# Patient Record
Sex: MALE | Race: WHITE | NOT HISPANIC OR LATINO
[De-identification: names, ages, dates, MRNs, and addresses within clinical notes are randomized per-mention and may not be internally consistent; named-entity substitution may affect disease eponyms.]

---

## 2021-04-18 PROBLEM — Z00.00 ENCOUNTER FOR PREVENTIVE HEALTH EXAMINATION: Status: ACTIVE | Noted: 2021-04-18

## 2021-04-19 ENCOUNTER — APPOINTMENT (OUTPATIENT)
Dept: HEART AND VASCULAR | Facility: CLINIC | Age: 66
End: 2021-04-19
Payer: COMMERCIAL

## 2021-04-19 PROCEDURE — 99214 OFFICE O/P EST MOD 30 MIN: CPT

## 2021-04-19 PROCEDURE — 99072 ADDL SUPL MATRL&STAF TM PHE: CPT

## 2021-04-22 RX ORDER — SENNOSIDES 8.6 MG TABLETS 8.6 MG/1
TABLET ORAL
Refills: 0 | Status: ACTIVE | COMMUNITY

## 2021-04-22 RX ORDER — CLONAZEPAM 0.25 MG/1
0.25 TABLET, ORALLY DISINTEGRATING ORAL
Refills: 0 | Status: ACTIVE | COMMUNITY

## 2021-04-22 RX ORDER — METHADONE HYDROCHLORIDE 10 MG/ML
CONCENTRATE ORAL
Refills: 0 | Status: ACTIVE | COMMUNITY

## 2021-04-22 RX ORDER — TOPIRAMATE 25 MG/1
25 CAPSULE, EXTENDED RELEASE ORAL
Refills: 0 | Status: ACTIVE | COMMUNITY

## 2021-04-22 NOTE — DATA REVIEWED
[FreeTextEntry1] : Jovan Stroud is an 66 y/o male referred by Dr. 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 Without any history of trauma he developed pain in that area with extreme sensitivity to touch several years ago. He had nerve blocks as well as surgical exploration performed by Dr. Wagoner years ago. He said that none of these procedures had an effect on his pain and hypersensitivity to touch in the area. He ended up seeing a pain management specialist and is currently on a regimen of methadone and several other medications without much relief. He apparently sent imaging electronically but so far we have been unable to access the scans.   He did show me some images on his cell phone of a CT and an ultrasound which do show what appears to be a fistula in the subcutaneous fat of the anterior abdominal wall. It was suggested on the CT that the blood supply was from the right inferior epigastric artery. On physical exam he has a scar in the area from previous surgical exploration. He is exquisitely tender to touch. I don't appreciate any pulsation on palpation. I did an ultrasound in the office and see a rounded structure that does not appear to show any flow (unlike the ultrasound he brought with him which was performed a few months back). Clearly this is not a typical problem and I told him I thought the best way to define this and possibly fix it is to do an angiogram with selective studies to try and find the fistula and close it with embolization if feasible. I also pointed out that AVMs and fistulas themselves are usually not painful so there may be something else going on, including a nerve compressed by the vascular lesion or possibly something totally unrelated. Whatever is causing the pain it seems to be neurogenic in origin. We will schedule him for the angiogram and possible embolization in the near future. He does have an allergy to generic Percocet with the GI intolerance.  He is also status post bilateral inguinal hernia repair performed by Wendy Wagoner and Artie.

## 2021-05-17 VITALS
HEART RATE: 60 BPM | SYSTOLIC BLOOD PRESSURE: 123 MMHG | OXYGEN SATURATION: 95 % | RESPIRATION RATE: 16 BRPM | DIASTOLIC BLOOD PRESSURE: 69 MMHG

## 2021-05-17 RX ORDER — CHLORHEXIDINE GLUCONATE 213 G/1000ML
1 SOLUTION TOPICAL ONCE
Refills: 0 | Status: DISCONTINUED | OUTPATIENT
Start: 2021-05-18 | End: 2021-05-18

## 2021-05-17 NOTE — H&P ADULT - NSHPLABSRESULTS_GEN_ALL_CORE
17.0   6.61  )-----------( 221      ( 18 May 2021 11:03 )             50.0   05-18    141  |  106  |  18  ----------------------------<  95  3.9   |  27  |  1.01    Ca    9.2      18 May 2021 11:04    TPro  7.3  /  Alb  4.3  /  TBili  0.4  /  DBili  x   /  AST  19  /  ALT  25  /  AlkPhos  62  05-18  PT/INR - ( 18 May 2021 11:03 )   PT: 11.4 sec;   INR: 0.95          PTT - ( 18 May 2021 11:03 )  PTT:33.4 sec

## 2021-05-17 NOTE — H&P ADULT - HISTORY OF PRESENT ILLNESS
COVID: ____________  Pharmacy: __________  Escort: ____________    History obtained from Dr. Palmer's note   64 y/o male referred by Dr. 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 Without any history of trauma, he developed pain in that area with extreme sensitivity to touch several years ago. He had nerve blocks as well as surgical exploration performed by Dr. Wagoner years ago. He said that none of these procedures had an effect on his pain and hypersensitivity to touch in the area. He ended up seeing a pain management specialist and is currently on a regimen of Methadone and several other medications without much relief. Patient did show Dr. Palmer images on his cell phone during office visit of a CT and ultrasound which did show what appears to be a fistula in the subcutaneous fat of the anterior abdominal wall. It was suggested on the CT that the blood supply was from the right inferior epigastric artery. On physical exam during office visit, he had a scar in the area from previous surgical exploration. He is exquisitely tender to touch. Dr. Palmer did not appreciate any pulsation on palpation. Dr. Palmer performed an ultrasound in the office and saw a rounded structure that does not appear to show any flow (unlike the ultrasound he brought with him which was performed a few months back). Dr. Palmer at that time felt this is not a typical problem and the best way to define and possibly fix this is to do an angiogram with selective studies to try and find the fistula and close it with embolization if feasible. Dr. Palmer also pointed out that AVM's and fistulas themselves are usually not painful so there may be something else going on, including a nerve compressed by the vascular lesion or possibly something totally unrelated. Dr. Palmer felt whatever is causing the pain seems to be neurogenic in origin. 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     In light of patient's symptoms, prior abnormal CT and ultrasound, and recent abnormal ultrasound at Dr. Palmer's office, patient now presents to [facility] for recommended angiogram/embolization.  COVID: Negative 05/16/2021, in [facility]   Pharmacy: __________  Escort: ____________    History obtained from Dr. Palmer's note   64 y/o male referred by Dr. 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 Without any history of trauma, he developed pain in that area with extreme sensitivity to touch several years ago. He had nerve blocks as well as surgical exploration performed by Dr. Wagoner years ago. He said that none of these procedures had an effect on his pain and hypersensitivity to touch in the area. He ended up seeing a pain management specialist and is currently on a regimen of Methadone and several other medications without much relief. Patient did show Dr. Palmer images on his cell phone during office visit of a CT and ultrasound which did show what appears to be a fistula in the subcutaneous fat of the anterior abdominal wall. It was suggested on the CT that the blood supply was from the right inferior epigastric artery. On physical exam during office visit, he had a scar in the area from previous surgical exploration. He is exquisitely tender to touch. Dr. Palmer did not appreciate any pulsation on palpation. Dr. Palmer performed an ultrasound in the office and saw a rounded structure that does not appear to show any flow (unlike the ultrasound he brought with him which was performed a few months back). Dr. Palmer at that time felt this is not a typical problem and the best way to define and possibly fix this is to do an angiogram with selective studies to try and find the fistula and close it with embolization if feasible. Dr. Palmer also pointed out that AVM's and fistulas themselves are usually not painful so there may be something else going on, including a nerve compressed by the vascular lesion or possibly something totally unrelated. Dr. Palmer felt whatever is causing the pain seems to be neurogenic in origin. 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     In light of patient's symptoms, prior abnormal CT and ultrasound, and recent abnormal ultrasound at Dr. Palmer's office, patient now presents to [facility] for recommended angiogram/embolization.  COVID: Negative 05/16/2021, in [facility]   Pharmacy: [facility], [place] [id number]      History obtained from Dr. Palmer's note   66 y/o male with PMH of malignant melanoma in L arm/leg s/p excision who was  referred by Dr. 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 Without any history of trauma, he developed pain in that area with extreme sensitivity to touch several years ago. He had nerve blocks as well as surgical exploration performed by Dr. Wagoner years ago. He said that none of these procedures had an effect on his pain and hypersensitivity to touch in the area. He ended up seeing a pain management specialist and is currently on a regimen of Methadone and several other medications without much relief. Patient did show Dr. Palmer images on his cell phone during office visit of a CT and ultrasound which did show what appears to be a fistula in the subcutaneous fat of the anterior abdominal wall. It was suggested on the CT that the blood supply was from the right inferior epigastric artery. On physical exam during office visit, he had a scar in the area from previous surgical exploration. He is exquisitely tender to touch. Dr. Palmer did not appreciate any pulsation on palpation. Dr. Palmer performed an ultrasound in the office and saw a rounded structure that does not appear to show any flow (unlike the ultrasound he brought with him which was performed a few months back). Dr. Palmer at that time felt this is not a typical problem and the best way to define and possibly fix this is to do an angiogram with selective studies to try and find the fistula and close it with embolization if feasible. Dr. Palmer also pointed out that AVM's and fistulas themselves are usually not painful so there may be something else going on, including a nerve compressed by the vascular lesion or possibly something totally unrelated. Dr. Palmer felt whatever is causing the pain seems to be neurogenic in origin. 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     In light of patient's symptoms, prior abnormal CT and ultrasound, and recent abnormal ultrasound at Dr. Palmer's office, patient now presents to [facility] for recommended angiogram/embolization.

## 2021-05-18 ENCOUNTER — INPATIENT (INPATIENT)
Facility: HOSPITAL | Age: 66
LOS: 0 days | Discharge: HOME | DRG: 272 | End: 2021-05-19
Attending: RADIOLOGY | Admitting: RADIOLOGY
Payer: COMMERCIAL

## 2021-05-18 DIAGNOSIS — Z98.890 OTHER SPECIFIED POSTPROCEDURAL STATES: Chronic | ICD-10-CM

## 2021-05-18 LAB
ALBUMIN SERPL ELPH-MCNC: 4.3 G/DL — SIGNIFICANT CHANGE UP (ref 3.3–5)
ALP SERPL-CCNC: 62 U/L — SIGNIFICANT CHANGE UP (ref 40–120)
ALT FLD-CCNC: 25 U/L — SIGNIFICANT CHANGE UP (ref 10–45)
ANION GAP SERPL CALC-SCNC: 8 MMOL/L — SIGNIFICANT CHANGE UP (ref 5–17)
APTT BLD: 33.4 SEC — SIGNIFICANT CHANGE UP (ref 27.5–35.5)
AST SERPL-CCNC: 19 U/L — SIGNIFICANT CHANGE UP (ref 10–40)
BASOPHILS # BLD AUTO: 0.06 K/UL — SIGNIFICANT CHANGE UP (ref 0–0.2)
BASOPHILS NFR BLD AUTO: 0.9 % — SIGNIFICANT CHANGE UP (ref 0–2)
BILIRUB SERPL-MCNC: 0.4 MG/DL — SIGNIFICANT CHANGE UP (ref 0.2–1.2)
BUN SERPL-MCNC: 18 MG/DL — SIGNIFICANT CHANGE UP (ref 7–23)
CALCIUM SERPL-MCNC: 9.2 MG/DL — SIGNIFICANT CHANGE UP (ref 8.4–10.5)
CHLORIDE SERPL-SCNC: 106 MMOL/L — SIGNIFICANT CHANGE UP (ref 96–108)
CHOLEST SERPL-MCNC: 225 MG/DL — HIGH
CO2 SERPL-SCNC: 27 MMOL/L — SIGNIFICANT CHANGE UP (ref 22–31)
CREAT SERPL-MCNC: 1.01 MG/DL — SIGNIFICANT CHANGE UP (ref 0.5–1.3)
EOSINOPHIL # BLD AUTO: 0.08 K/UL — SIGNIFICANT CHANGE UP (ref 0–0.5)
EOSINOPHIL NFR BLD AUTO: 1.2 % — SIGNIFICANT CHANGE UP (ref 0–6)
GLUCOSE SERPL-MCNC: 95 MG/DL — SIGNIFICANT CHANGE UP (ref 70–99)
HCT VFR BLD CALC: 50 % — SIGNIFICANT CHANGE UP (ref 39–50)
HDLC SERPL-MCNC: 47 MG/DL — SIGNIFICANT CHANGE UP
HGB BLD-MCNC: 17 G/DL — SIGNIFICANT CHANGE UP (ref 13–17)
IMM GRANULOCYTES NFR BLD AUTO: 0.3 % — SIGNIFICANT CHANGE UP (ref 0–1.5)
INR BLD: 0.95 — SIGNIFICANT CHANGE UP (ref 0.88–1.16)
LIPID PNL WITH DIRECT LDL SERPL: 150 MG/DL — HIGH
LYMPHOCYTES # BLD AUTO: 2.4 K/UL — SIGNIFICANT CHANGE UP (ref 1–3.3)
LYMPHOCYTES # BLD AUTO: 36.3 % — SIGNIFICANT CHANGE UP (ref 13–44)
MCHC RBC-ENTMCNC: 30.5 PG — SIGNIFICANT CHANGE UP (ref 27–34)
MCHC RBC-ENTMCNC: 34 GM/DL — SIGNIFICANT CHANGE UP (ref 32–36)
MCV RBC AUTO: 89.8 FL — SIGNIFICANT CHANGE UP (ref 80–100)
MONOCYTES # BLD AUTO: 0.65 K/UL — SIGNIFICANT CHANGE UP (ref 0–0.9)
MONOCYTES NFR BLD AUTO: 9.8 % — SIGNIFICANT CHANGE UP (ref 2–14)
NEUTROPHILS # BLD AUTO: 3.4 K/UL — SIGNIFICANT CHANGE UP (ref 1.8–7.4)
NEUTROPHILS NFR BLD AUTO: 51.5 % — SIGNIFICANT CHANGE UP (ref 43–77)
NON HDL CHOLESTEROL: 178 MG/DL — HIGH
NRBC # BLD: 0 /100 WBCS — SIGNIFICANT CHANGE UP (ref 0–0)
PLATELET # BLD AUTO: 221 K/UL — SIGNIFICANT CHANGE UP (ref 150–400)
POTASSIUM SERPL-MCNC: 3.9 MMOL/L — SIGNIFICANT CHANGE UP (ref 3.5–5.3)
POTASSIUM SERPL-SCNC: 3.9 MMOL/L — SIGNIFICANT CHANGE UP (ref 3.5–5.3)
PROT SERPL-MCNC: 7.3 G/DL — SIGNIFICANT CHANGE UP (ref 6–8.3)
PROTHROM AB SERPL-ACNC: 11.4 SEC — SIGNIFICANT CHANGE UP (ref 10.6–13.6)
RBC # BLD: 5.57 M/UL — SIGNIFICANT CHANGE UP (ref 4.2–5.8)
RBC # FLD: 12.1 % — SIGNIFICANT CHANGE UP (ref 10.3–14.5)
SODIUM SERPL-SCNC: 141 MMOL/L — SIGNIFICANT CHANGE UP (ref 135–145)
TRIGL SERPL-MCNC: 140 MG/DL — SIGNIFICANT CHANGE UP
WBC # BLD: 6.61 K/UL — SIGNIFICANT CHANGE UP (ref 3.8–10.5)
WBC # FLD AUTO: 6.61 K/UL — SIGNIFICANT CHANGE UP (ref 3.8–10.5)

## 2021-05-18 PROCEDURE — 36246 INS CATH ABD/L-EXT ART 2ND: CPT

## 2021-05-18 PROCEDURE — 37242 VASC EMBOLIZE/OCCLUDE ARTERY: CPT

## 2021-05-18 PROCEDURE — 75736 ARTERY X-RAYS PELVIS: CPT | Mod: 26,XU

## 2021-05-18 PROCEDURE — 93010 ELECTROCARDIOGRAM REPORT: CPT

## 2021-05-18 RX ORDER — HYDROMORPHONE HYDROCHLORIDE 2 MG/ML
2 INJECTION INTRAMUSCULAR; INTRAVENOUS; SUBCUTANEOUS THREE TIMES A DAY
Refills: 0 | Status: DISCONTINUED | OUTPATIENT
Start: 2021-05-18 | End: 2021-05-19

## 2021-05-18 RX ORDER — ONDANSETRON 8 MG/1
4 TABLET, FILM COATED ORAL EVERY 4 HOURS
Refills: 0 | Status: DISCONTINUED | OUTPATIENT
Start: 2021-05-18 | End: 2021-05-19

## 2021-05-18 RX ORDER — TOPIRAMATE 25 MG
25 TABLET ORAL DAILY
Refills: 0 | Status: DISCONTINUED | OUTPATIENT
Start: 2021-05-18 | End: 2021-05-19

## 2021-05-18 RX ORDER — CLONAZEPAM 1 MG
0.25 TABLET ORAL DAILY
Refills: 0 | Status: DISCONTINUED | OUTPATIENT
Start: 2021-05-18 | End: 2021-05-19

## 2021-05-18 RX ORDER — KETOROLAC TROMETHAMINE 30 MG/ML
30 SYRINGE (ML) INJECTION EVERY 4 HOURS
Refills: 0 | Status: DISCONTINUED | OUTPATIENT
Start: 2021-05-18 | End: 2021-05-19

## 2021-05-18 RX ORDER — MORPHINE SULFATE 50 MG/1
4 CAPSULE, EXTENDED RELEASE ORAL EVERY 4 HOURS
Refills: 0 | Status: DISCONTINUED | OUTPATIENT
Start: 2021-05-18 | End: 2021-05-18

## 2021-05-18 RX ORDER — KETOROLAC TROMETHAMINE 30 MG/ML
40 SYRINGE (ML) INJECTION EVERY 6 HOURS
Refills: 0 | Status: DISCONTINUED | OUTPATIENT
Start: 2021-05-18 | End: 2021-05-18

## 2021-05-18 RX ORDER — METHADONE HYDROCHLORIDE 40 MG/1
0.5 TABLET ORAL THREE TIMES A DAY
Refills: 0 | Status: DISCONTINUED | OUTPATIENT
Start: 2021-05-18 | End: 2021-05-19

## 2021-05-18 RX ORDER — CEFAZOLIN SODIUM 1 G
1000 VIAL (EA) INJECTION EVERY 8 HOURS
Refills: 0 | Status: DISCONTINUED | OUTPATIENT
Start: 2021-05-18 | End: 2021-05-19

## 2021-05-18 RX ADMIN — Medication 100 MILLIGRAM(S): at 22:33

## 2021-05-18 RX ADMIN — HYDROMORPHONE HYDROCHLORIDE 2 MILLIGRAM(S): 2 INJECTION INTRAMUSCULAR; INTRAVENOUS; SUBCUTANEOUS at 19:59

## 2021-05-18 RX ADMIN — Medication 30 MILLIGRAM(S): at 19:04

## 2021-05-18 RX ADMIN — Medication 0.25 MILLIGRAM(S): at 23:08

## 2021-05-18 RX ADMIN — Medication 25 MILLIGRAM(S): at 23:08

## 2021-05-18 NOTE — BRIEF OPERATIVE NOTE - OPERATION/FINDINGS
Patient prepped and draped in sterile fashion. R CFA access was obtained via 5F Micropuncture technique. Angiography was performed of the ipsilateral limb which demonstrated a tiny area of the inferior epigastric artery that could represent a pseudoaneurysm. The inferior epigastric artery was then selectively cannulated using microcatheterization which demonstrated and confirmed the presence of pseudoaneurysm. 0.05cc of nBCA glue was then administered intraarterially into the inferior epigastric branch. At this point, ultrasound guided confirmation of the pseudoaneurysm was performed which demonstrated thrombosis and no flow in that vessel. 5F sheath was pulled and 20 minutes of manual pressure held with full hemostasis at the end of the case. Patient extubated without any complications.

## 2021-05-19 ENCOUNTER — TRANSCRIPTION ENCOUNTER (OUTPATIENT)
Age: 66
End: 2021-05-19

## 2021-05-19 VITALS — TEMPERATURE: 99 F

## 2021-05-19 LAB
ALBUMIN SERPL ELPH-MCNC: 3.8 G/DL — SIGNIFICANT CHANGE UP (ref 3.3–5)
ALP SERPL-CCNC: 58 U/L — SIGNIFICANT CHANGE UP (ref 40–120)
ALT FLD-CCNC: 25 U/L — SIGNIFICANT CHANGE UP (ref 10–45)
ANION GAP SERPL CALC-SCNC: 13 MMOL/L — SIGNIFICANT CHANGE UP (ref 5–17)
AST SERPL-CCNC: 21 U/L — SIGNIFICANT CHANGE UP (ref 10–40)
BILIRUB SERPL-MCNC: 0.5 MG/DL — SIGNIFICANT CHANGE UP (ref 0.2–1.2)
BUN SERPL-MCNC: 18 MG/DL — SIGNIFICANT CHANGE UP (ref 7–23)
CALCIUM SERPL-MCNC: 9 MG/DL — SIGNIFICANT CHANGE UP (ref 8.4–10.5)
CHLORIDE SERPL-SCNC: 106 MMOL/L — SIGNIFICANT CHANGE UP (ref 96–108)
CO2 SERPL-SCNC: 20 MMOL/L — LOW (ref 22–31)
COVID-19 SPIKE DOMAIN AB INTERP: POSITIVE
COVID-19 SPIKE DOMAIN ANTIBODY RESULT: >250 U/ML — HIGH
CREAT SERPL-MCNC: 1 MG/DL — SIGNIFICANT CHANGE UP (ref 0.5–1.3)
GLUCOSE SERPL-MCNC: 124 MG/DL — HIGH (ref 70–99)
HCT VFR BLD CALC: 47.8 % — SIGNIFICANT CHANGE UP (ref 39–50)
HCV AB S/CO SERPL IA: 0.03 S/CO — SIGNIFICANT CHANGE UP
HCV AB SERPL-IMP: SIGNIFICANT CHANGE UP
HGB BLD-MCNC: 16.4 G/DL — SIGNIFICANT CHANGE UP (ref 13–17)
MAGNESIUM SERPL-MCNC: 1.9 MG/DL — SIGNIFICANT CHANGE UP (ref 1.6–2.6)
MCHC RBC-ENTMCNC: 30 PG — SIGNIFICANT CHANGE UP (ref 27–34)
MCHC RBC-ENTMCNC: 34.3 GM/DL — SIGNIFICANT CHANGE UP (ref 32–36)
MCV RBC AUTO: 87.5 FL — SIGNIFICANT CHANGE UP (ref 80–100)
NRBC # BLD: 0 /100 WBCS — SIGNIFICANT CHANGE UP (ref 0–0)
PLATELET # BLD AUTO: 235 K/UL — SIGNIFICANT CHANGE UP (ref 150–400)
POTASSIUM SERPL-MCNC: 3.8 MMOL/L — SIGNIFICANT CHANGE UP (ref 3.5–5.3)
POTASSIUM SERPL-SCNC: 3.8 MMOL/L — SIGNIFICANT CHANGE UP (ref 3.5–5.3)
PROT SERPL-MCNC: 6.7 G/DL — SIGNIFICANT CHANGE UP (ref 6–8.3)
RBC # BLD: 5.46 M/UL — SIGNIFICANT CHANGE UP (ref 4.2–5.8)
RBC # FLD: 11.9 % — SIGNIFICANT CHANGE UP (ref 10.3–14.5)
SARS-COV-2 IGG+IGM SERPL QL IA: >250 U/ML — HIGH
SARS-COV-2 IGG+IGM SERPL QL IA: POSITIVE
SODIUM SERPL-SCNC: 139 MMOL/L — SIGNIFICANT CHANGE UP (ref 135–145)
WBC # BLD: 10.93 K/UL — HIGH (ref 3.8–10.5)
WBC # FLD AUTO: 10.93 K/UL — HIGH (ref 3.8–10.5)

## 2021-05-19 PROCEDURE — 86803 HEPATITIS C AB TEST: CPT

## 2021-05-19 PROCEDURE — 80053 COMPREHEN METABOLIC PANEL: CPT

## 2021-05-19 PROCEDURE — 80061 LIPID PANEL: CPT

## 2021-05-19 PROCEDURE — C1887: CPT

## 2021-05-19 PROCEDURE — 93005 ELECTROCARDIOGRAM TRACING: CPT

## 2021-05-19 PROCEDURE — C1894: CPT

## 2021-05-19 PROCEDURE — 85730 THROMBOPLASTIN TIME PARTIAL: CPT

## 2021-05-19 PROCEDURE — 83735 ASSAY OF MAGNESIUM: CPT

## 2021-05-19 PROCEDURE — 85027 COMPLETE CBC AUTOMATED: CPT

## 2021-05-19 PROCEDURE — 36415 COLL VENOUS BLD VENIPUNCTURE: CPT

## 2021-05-19 PROCEDURE — 85610 PROTHROMBIN TIME: CPT

## 2021-05-19 PROCEDURE — C1769: CPT

## 2021-05-19 PROCEDURE — 85025 COMPLETE CBC W/AUTO DIFF WBC: CPT

## 2021-05-19 PROCEDURE — C1889: CPT

## 2021-05-19 PROCEDURE — 86769 SARS-COV-2 COVID-19 ANTIBODY: CPT

## 2021-05-19 RX ORDER — METHADONE HYDROCHLORIDE 40 MG/1
25 TABLET ORAL AT BEDTIME
Refills: 0 | Status: DISCONTINUED | OUTPATIENT
Start: 2021-05-19 | End: 2021-05-19

## 2021-05-19 RX ORDER — POTASSIUM CHLORIDE 20 MEQ
40 PACKET (EA) ORAL ONCE
Refills: 0 | Status: COMPLETED | OUTPATIENT
Start: 2021-05-19 | End: 2021-05-19

## 2021-05-19 RX ORDER — METHADONE HYDROCHLORIDE 40 MG/1
10 TABLET ORAL ONCE
Refills: 0 | Status: DISCONTINUED | OUTPATIENT
Start: 2021-05-19 | End: 2021-05-19

## 2021-05-19 RX ORDER — HYDROMORPHONE HYDROCHLORIDE 2 MG/ML
2 INJECTION INTRAMUSCULAR; INTRAVENOUS; SUBCUTANEOUS EVERY 4 HOURS
Refills: 0 | Status: DISCONTINUED | OUTPATIENT
Start: 2021-05-19 | End: 2021-05-19

## 2021-05-19 RX ORDER — MAGNESIUM OXIDE 400 MG ORAL TABLET 241.3 MG
400 TABLET ORAL ONCE
Refills: 0 | Status: COMPLETED | OUTPATIENT
Start: 2021-05-19 | End: 2021-05-19

## 2021-05-19 RX ORDER — CEPHALEXIN 500 MG
1 CAPSULE ORAL
Qty: 28 | Refills: 0
Start: 2021-05-19 | End: 2021-05-25

## 2021-05-19 RX ORDER — METHADONE HYDROCHLORIDE 40 MG/1
10 TABLET ORAL AT BEDTIME
Refills: 0 | Status: DISCONTINUED | OUTPATIENT
Start: 2021-05-19 | End: 2021-05-19

## 2021-05-19 RX ORDER — METHADONE HYDROCHLORIDE 40 MG/1
5 TABLET ORAL DAILY
Refills: 0 | Status: DISCONTINUED | OUTPATIENT
Start: 2021-05-19 | End: 2021-05-19

## 2021-05-19 RX ADMIN — Medication 40 MILLIEQUIVALENT(S): at 11:26

## 2021-05-19 RX ADMIN — Medication 30 MILLIGRAM(S): at 02:30

## 2021-05-19 RX ADMIN — Medication 30 MILLIGRAM(S): at 01:58

## 2021-05-19 RX ADMIN — ONDANSETRON 4 MILLIGRAM(S): 8 TABLET, FILM COATED ORAL at 01:58

## 2021-05-19 RX ADMIN — METHADONE HYDROCHLORIDE 5 MILLIGRAM(S): 40 TABLET ORAL at 11:29

## 2021-05-19 RX ADMIN — HYDROMORPHONE HYDROCHLORIDE 2 MILLIGRAM(S): 2 INJECTION INTRAMUSCULAR; INTRAVENOUS; SUBCUTANEOUS at 06:15

## 2021-05-19 RX ADMIN — Medication 100 MILLIGRAM(S): at 05:29

## 2021-05-19 RX ADMIN — METHADONE HYDROCHLORIDE 10 MILLIGRAM(S): 40 TABLET ORAL at 00:32

## 2021-05-19 RX ADMIN — MAGNESIUM OXIDE 400 MG ORAL TABLET 400 MILLIGRAM(S): 241.3 TABLET ORAL at 11:26

## 2021-05-19 RX ADMIN — HYDROMORPHONE HYDROCHLORIDE 2 MILLIGRAM(S): 2 INJECTION INTRAMUSCULAR; INTRAVENOUS; SUBCUTANEOUS at 05:44

## 2021-05-19 NOTE — DISCHARGE NOTE NURSING/CASE MANAGEMENT/SOCIAL WORK - PATIENT PORTAL LINK FT
You can access the FollowMyHealth Patient Portal offered by [facility] by registering at the following website: [email or web address]. By joining [employer]’s FollowMyHealth portal, you will also be able to view your health information using other applications (apps) compatible with our system.

## 2021-05-19 NOTE — DISCHARGE NOTE PROVIDER - NSDCCPCAREPLAN_GEN_ALL_CORE_FT
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   -Your procedure was done through your groin    -You do not need to keep this area covered and you may shower   -Please avoid any heavy lifting  (no more than 3 to 5 lbs) or strenuous activity for five days   -If you develop any swelling, bleeding, hardening of the skin (hematoma formation), acute pain, numbness/tingling  in your leg please contact your doctor immediately or call our 24/7 line: [phone]  -Please follow up with your Pain Management Speciliast and Dr. Palmer as scheduled.       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   -Your procedure was done through your groin    -You do not need to keep this area covered and you may shower   -Please avoid any heavy lifting  (no more than 3 to 5 lbs) or strenuous activity for five days   -If you develop any swelling, bleeding, hardening of the skin (hematoma formation), acute pain, numbness/tingling  in your leg please contact your doctor immediately or call our 24/7 line: [phone]  -Please follow up with your Pain Management Speciliast and Dr. Palmer as scheduled.

## 2021-05-19 NOTE — DISCHARGE NOTE PROVIDER - HOSPITAL COURSE
66 y/o male with PMH of malignant melanoma in L arm/leg s/p excision who was  referred by Dr. 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 Without any history of trauma, he developed pain in that area with extreme sensitivity to touch several years ago. He had nerve blocks as well as surgical exploration performed by Dr. Wagoner years ago. He said that none of these procedures had an effect on his pain and hypersensitivity to touch in the area. He ended up seeing a pain management specialist and is currently on a regimen of Methadone and several other medications without much relief. Patient did show Dr. Palmer images on his cell phone during office visit of a CT and ultrasound which did show what appears to be a fistula in the subcutaneous fat of the anterior abdominal wall. It was suggested on the CT that the blood supply was from the right inferior epigastric artery. On physical exam during office visit, he had a scar in the area from previous surgical exploration. He is exquisitely tender to touch. Dr. Palmer did not appreciate any pulsation on palpation. Dr. Palmer performed an ultrasound in the office and saw a rounded structure that does not appear to show any flow (unlike the ultrasound he brought with him which was performed a few months back). Dr. Palmer at that time felt this is not a typical problem and the best way to define and possibly fix this is to do an angiogram with selective studies to try and find the fistula and close it with embolization if feasible. Dr. Palmer also pointed out that AVM's and fistulas themselves are usually not painful so there may be something else going on, including a nerve compressed by the vascular lesion or possibly something totally unrelated. Dr. Palmer felt whatever is causing the pain seems to be neurogenic in origin. 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     Patient recommended angiogram/embolization and is now s/p visceral angiogram (5/18/2021) revealing a tiny area of the inferior epigastric artery that could represent a pseudoaneurysm. The inferior epigastric artery was then selectively cannulated using microcatheterization which demonstrated and confirmed the presence of pseudoaneurysm. 0.05cc of nBCA glue was then administered intraarterially into the inferior epigastric branch. At this point, ultrasound guided confirmation of the pseudoaneurysm was performed which demonstrated thrombosis and no flow in that vessel.  Patient was admitted overnight for monitoring and has been seen and examined at bedside this morning by Dr. Palmer and PA. Lab values, telemetry and vital signs reviewed and remained stable overnight. Patient has been cleared for discharge per Dr. Palmer. Prescription sent to patient's preferred pharmacy (Keflex 500 mg orally BID x seven days). All discharge instructions and medications reviewed with patient at bedside. Patient to follow up with Dr. Palmer as scheduled.

## 2021-05-19 NOTE — DISCHARGE NOTE PROVIDER - CARE PROVIDER_API CALL
Gurmeet Palmer)  Diagnostic Radiology  [address], [address] [place] [address]  Phone: [phone]  Fax: [phone]  Follow Up Time: 2 weeks

## 2021-05-19 NOTE — DISCHARGE NOTE PROVIDER - NSDCFUADDINST_GEN_ALL_CORE_FT
-Your procedure was done through your groin    -You do not need to keep this area covered and you may shower   -Please avoid any heavy lifting  (no more than 3 to 5 lbs) or strenuous activity for five days   -If you develop any swelling, bleeding, hardening of the skin (hematoma formation), acute pain, numbness/tingling  in your leg please contact your doctor immediately or call our 24/7 line: [phone]

## 2021-05-19 NOTE — DISCHARGE NOTE PROVIDER - NSDCMRMEDTOKEN_GEN_ALL_CORE_FT
clonazePAM 0.25 mg oral tablet: orally once a day  Keflex 500 mg oral capsule: 1 cap(s) orally 4 times a day   methadone: 0.5 milligram(s) orally 3 times a day  Topamax 25 mg oral tablet: orally once a day

## 2021-05-26 DIAGNOSIS — Z91.011 ALLERGY TO MILK PRODUCTS: ICD-10-CM

## 2021-05-26 DIAGNOSIS — Z85.820 PERSONAL HISTORY OF MALIGNANT MELANOMA OF SKIN: ICD-10-CM

## 2021-05-26 DIAGNOSIS — Z87.891 PERSONAL HISTORY OF NICOTINE DEPENDENCE: ICD-10-CM

## 2021-05-26 DIAGNOSIS — I72.8 ANEURYSM OF OTHER SPECIFIED ARTERIES: ICD-10-CM

## 2021-05-26 DIAGNOSIS — Q27.39 ARTERIOVENOUS MALFORMATION, OTHER SITE: ICD-10-CM

## 2021-06-28 ENCOUNTER — APPOINTMENT (OUTPATIENT)
Dept: HEART AND VASCULAR | Facility: CLINIC | Age: 66
End: 2021-06-28
Payer: COMMERCIAL

## 2021-06-28 VITALS
HEART RATE: 76 BPM | OXYGEN SATURATION: 98 % | WEIGHT: 195 LBS | BODY MASS INDEX: 28.88 KG/M2 | DIASTOLIC BLOOD PRESSURE: 78 MMHG | TEMPERATURE: 97 F | HEIGHT: 69 IN | SYSTOLIC BLOOD PRESSURE: 130 MMHG

## 2021-06-28 DIAGNOSIS — Z78.9 OTHER SPECIFIED HEALTH STATUS: ICD-10-CM

## 2021-06-28 PROCEDURE — 99072 ADDL SUPL MATRL&STAF TM PHE: CPT

## 2021-06-28 PROCEDURE — 99214 OFFICE O/P EST MOD 30 MIN: CPT

## 2021-07-07 PROBLEM — Z78.9 NO PERTINENT PAST MEDICAL HISTORY: Status: RESOLVED | Noted: 2021-07-07 | Resolved: 2021-07-07

## 2021-07-07 NOTE — ASSESSMENT
[FreeTextEntry1] : Mr. Jamison is 6 week status post angiography and both transarterial and direct embolization of a small pseudoaneurysm in the right anterior abdominal wall which was causing severe and constant pain. The origin of the pseudoaneurysm was and remains unclear. He reports dramatic improvement in his symptoms and the constant pain which formerly kept him awake and required large doses of pain medication has mostly resolved. He still has some steady discomfort and tenderness in that area but it is dramatically better. I did an ultrasound in the office and there is still a small (<1cm) round lesion in the abdominal musculature which no longer shows any flow. I told him that his pain would probably improve over the next few months. It seems likely that the pseudoaneurysm was compressing a nerve which was actually cause of pain as the pseudoaneurysm itself would not be expected to be that painful. I asked him to contact us in 3 months and let us know how he is progressing.

## 2021-09-13 ENCOUNTER — APPOINTMENT (OUTPATIENT)
Dept: HEART AND VASCULAR | Facility: CLINIC | Age: 66
End: 2021-09-13
Payer: COMMERCIAL

## 2021-09-13 DIAGNOSIS — R10.9 UNSPECIFIED ABDOMINAL PAIN: ICD-10-CM

## 2021-09-13 DIAGNOSIS — I72.9 ANEURYSM OF UNSPECIFIED SITE: ICD-10-CM

## 2021-09-13 DIAGNOSIS — Q27.9 CONGENITAL MALFORMATION OF PERIPHERAL VASCULAR SYSTEM, UNSPECIFIED: ICD-10-CM

## 2021-09-13 PROCEDURE — 99214 OFFICE O/P EST MOD 30 MIN: CPT

## 2021-09-14 PROBLEM — Q27.9 VASCULAR MALFORMATION: Status: ACTIVE | Noted: 2021-04-22

## 2021-09-14 PROBLEM — I72.9 PSEUDOANEURYSM: Status: ACTIVE | Noted: 2021-07-07

## 2021-09-14 PROBLEM — R10.9 ABDOMINAL WALL PAIN: Status: ACTIVE | Noted: 2021-09-14

## 2021-09-14 NOTE — ASSESSMENT
[FreeTextEntry1] : This is a 65-year-old male with an unusual history of a painful area in the right lower abdominal wall which was explored surgically once and embolized once here when it appeared to be a small pseudoaneurysm. The pain has always been disproportionate to the size of the small round lesion but the pain is precisely in the spot where the lesion rests. He said that after the embolization procedure a few months ago he was doing much better with little pain but over last 2 weeks he said he is again feeling tenderness there and it is painful when he lies on his side. On physical exam he has point tenderness and when an ultrasound was performed over the area a 1 cm round cystic-appearing lesion is noted. No flow was visible on ultrasound. Even prior to her procedure there was some question about whether this was vascular in nature but it did appear to be a vascular when we did the embolization. The diagnosis is still something of a mystery and I suggested that we have him come in again and enter the lesion with a fine-needle to see if we can obliterate it using embolization again and if not to put a radiopaque marker (platinum microcoil and/or methylene blue) and have the lesion removed surgically, possibly at the same sitting.

## 2021-09-14 NOTE — PHYSICAL EXAM
[Alert] : alert [PERRL] : pupils equal, round and reactive to light [No Acute Distress] : no acute distress [Normal Hearing] : hearing was normal [No Neck Mass] : no neck mass was observed [No Respiratory Distress] : no respiratory distress [Normal Rate] : heart rate was normal  [Regular Rhythm] : with a regular rhythm [No Edema] : there was no peripheral edema [Femoral Arteries Normal] : femoral pulses were normal without bruits [Normal Bowel Sounds] : normal bowel sounds [Not Tender] : non-tender [Not Distended] : not distended [Normal Gait] : normal gait [Normal Strength/Tone] : muscle strength and tone were normal [No Rash] : no rash [No Skin Lesions] : no skin lesions [No Motor Deficits] : the motor exam was normal [No Sensory Deficits] : the sensory exam was normal to light touch and pinprick [Oriented x3] : oriented to person, place, and time

## 2021-12-10 VITALS
HEART RATE: 64 BPM | WEIGHT: 197.98 LBS | SYSTOLIC BLOOD PRESSURE: 142 MMHG | HEIGHT: 69 IN | OXYGEN SATURATION: 96 % | DIASTOLIC BLOOD PRESSURE: 76 MMHG | TEMPERATURE: 98 F

## 2021-12-10 RX ORDER — CHLORHEXIDINE GLUCONATE 213 G/1000ML
1 SOLUTION TOPICAL ONCE
Refills: 0 | Status: DISCONTINUED | OUTPATIENT
Start: 2021-12-21 | End: 2022-01-04

## 2021-12-10 NOTE — H&P ADULT - HISTORY OF PRESENT ILLNESS
COVID: ________  Pharmacy: [facility] [place] [id number]  67 y/o male with PMH of malignant melanoma in L arm/leg s/p excision who was  referred by Dr. 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 s/p visceral angiogram (5/18/2021) revealing a tiny area of the inferior epigastric artery that could represent a pseudoaneurysm. The inferior epigastric artery was then selectively cannulated using microcatheterization which demonstrated and confirmed the presence of pseudoaneurysm. 0.05cc of nBCA glue was then administered intraarterially into the inferior epigastric branch. At this point, ultrasound guided confirmation of the pseudoaneurysm was performed which demonstrated thrombosis and no flow in that vessel. Pain disproportionate to size or lesion but in precise spot. After embolization doing much better with little pain over last 2 weeks but TTP and painful when lies on his side. On PE point tenderness and during US over 1 cm round cystic lesion. No flow visible in US. Prior to embolization questions about whether it was vascular in nature but appeared vascular during embolization. Pt now presents for recommended for fine needle embolization.    COVID: Neg 12/18/202 (in chart)  Pharmacy: [facility] [address] [place] [id number]  67 y/o male with PMH of malignant melanoma in L arm/leg s/p excision who was  referred by Dr. 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 s/p visceral angiogram (5/18/2021) revealing a tiny area of the inferior epigastric artery that could represent a pseudoaneurysm. The inferior epigastric artery was then selectively cannulated using microcatheterization which demonstrated and confirmed the presence of pseudoaneurysm. 0.05cc of nBCA glue was then administered intraarterially into the inferior epigastric branch. At this point, ultrasound guided confirmation of the pseudoaneurysm was performed which demonstrated thrombosis and no flow in that vessel. Pain disproportionate to size or lesion but in precise spot. After embolization doing much better with little pain over last 2 weeks but TTP and painful when lies on his side. On PE point tenderness and during US over 1 cm round cystic lesion. No flow visible in US. Prior to embolization questions about whether it was vascular in nature but appeared vascular during embolization. Pt now presents for recommended for fine needle embolization.

## 2021-12-10 NOTE — H&P ADULT - ASSESSMENT
65 y/o male with PMH of malignant melanoma in L arm/leg s/p excision who was referred by Dr. 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 s/p visceral angiogram (5/18/2021) revealing a tiny area of the inferior epigastric artery that could represent a pseudoaneurysm. Pt now presents for recommended for fine needle embolization.     -ASA III & Mallampati III  -VSS  -Sedation as per general anesthesiologist   -Candidate for sedation     Risks & benefits of procedure and alternative therapy have been explained to the patient by Dr. Palmer including but not limited to: allergic reaction, bleeding w/possible need for blood transfusion, infection, renal and vascular compromise, limb damage, emergent surgery. Informed consent obtained and in chart.

## 2021-12-10 NOTE — H&P ADULT - NSICDXPASTMEDICALHX_GEN_ALL_CORE_FT
Nicolas Cash Patient Age: 56 year old  MESSAGE:   Pt is calling regarding his appointment today, 6/1/20 at 9:00AM.  Pt states he missed the appointment and is inquiring if he can be worked in today as he needs to have appt. Pt has another appt with another physician at 3:00PM today.  Please advise.     WEIGHT AND HEIGHT:   Wt Readings from Last 1 Encounters:   06/01/20 68.5 kg (151 lb)     Ht Readings from Last 1 Encounters:   06/01/20 5' 7\" (1.702 m)     BMI Readings from Last 1 Encounters:   06/01/20 23.65 kg/m²       ALLERGIES:  Iodinated diagnostic agents; Contrast media; and Diclofenac  Current Outpatient Medications   Medication   • HYDROcodone-acetaminophen (NORCO) 5-325 MG per tablet   • ipratropium-albuterol (DUONEB) 0.5-2.5 (3) MG/3ML nebulizer solution   • acetaminophen-codeine (TYLENOL NO.3) 300-30 MG per tablet   • vitamin E 1000 units capsule   • MULTIPLE VITAMIN PO   • tadalafil (CIALIS) 20 MG tablet   • albuterol 108 (90 Base) MCG/ACT inhaler   • fluticasone (FLONASE) 50 MCG/ACT nasal spray   • cetirizine (ZYRTEC) 10 MG tablet   • Omega-3 Fatty Acids (FISH OIL) 1000 MG CAPSULE DELAYED RELEASE     No current facility-administered medications for this visit.      PHARMACY to use: n/a          Pharmacy preference(s) on file:   [id number] - [address]  [address] [place] [id number]  Phone: [phone] Fax: [phone]      CALL BACK INFO: Ok to leave response (including medical information) on answering machine  ROUTING: Patient's physician/staff        PCP: Asa Martin MD         INS: Payor: [employer] CLAIM[employer] / Plan: OPEN [id number] / Product Type: POS MISC   PATIENT ADDRESS:  [address] [place] [id number]   PAST MEDICAL HISTORY:  AVM (congenital arteriovenous malformation)

## 2021-12-21 ENCOUNTER — OUTPATIENT (OUTPATIENT)
Dept: OUTPATIENT SERVICES | Facility: HOSPITAL | Age: 66
LOS: 1 days | Discharge: HOME | End: 2021-12-21
Payer: COMMERCIAL

## 2021-12-21 DIAGNOSIS — Z98.890 OTHER SPECIFIED POSTPROCEDURAL STATES: Chronic | ICD-10-CM

## 2021-12-21 LAB
ALBUMIN SERPL ELPH-MCNC: 4.3 G/DL — SIGNIFICANT CHANGE UP (ref 3.3–5)
ALP SERPL-CCNC: 79 U/L — SIGNIFICANT CHANGE UP (ref 40–120)
ALT FLD-CCNC: 32 U/L — SIGNIFICANT CHANGE UP (ref 10–45)
ANION GAP SERPL CALC-SCNC: 9 MMOL/L — SIGNIFICANT CHANGE UP (ref 5–17)
APTT BLD: 34.5 SEC — SIGNIFICANT CHANGE UP (ref 27.5–35.5)
AST SERPL-CCNC: 34 U/L — SIGNIFICANT CHANGE UP (ref 10–40)
BASOPHILS # BLD AUTO: 0.07 K/UL — SIGNIFICANT CHANGE UP (ref 0–0.2)
BASOPHILS NFR BLD AUTO: 1.3 % — SIGNIFICANT CHANGE UP (ref 0–2)
BILIRUB SERPL-MCNC: 0.6 MG/DL — SIGNIFICANT CHANGE UP (ref 0.2–1.2)
BUN SERPL-MCNC: 15 MG/DL — SIGNIFICANT CHANGE UP (ref 7–23)
CALCIUM SERPL-MCNC: 9.4 MG/DL — SIGNIFICANT CHANGE UP (ref 8.4–10.5)
CHLORIDE SERPL-SCNC: 105 MMOL/L — SIGNIFICANT CHANGE UP (ref 96–108)
CO2 SERPL-SCNC: 23 MMOL/L — SIGNIFICANT CHANGE UP (ref 22–31)
CREAT SERPL-MCNC: 0.91 MG/DL — SIGNIFICANT CHANGE UP (ref 0.5–1.3)
EOSINOPHIL # BLD AUTO: 0.07 K/UL — SIGNIFICANT CHANGE UP (ref 0–0.5)
EOSINOPHIL NFR BLD AUTO: 1.3 % — SIGNIFICANT CHANGE UP (ref 0–6)
GLUCOSE SERPL-MCNC: 110 MG/DL — HIGH (ref 70–99)
HCT VFR BLD CALC: 47.9 % — SIGNIFICANT CHANGE UP (ref 39–50)
HGB BLD-MCNC: 16.5 G/DL — SIGNIFICANT CHANGE UP (ref 13–17)
IMM GRANULOCYTES NFR BLD AUTO: 0.2 % — SIGNIFICANT CHANGE UP (ref 0–1.5)
INR BLD: 0.99 — SIGNIFICANT CHANGE UP (ref 0.88–1.16)
LYMPHOCYTES # BLD AUTO: 1.98 K/UL — SIGNIFICANT CHANGE UP (ref 1–3.3)
LYMPHOCYTES # BLD AUTO: 36.4 % — SIGNIFICANT CHANGE UP (ref 13–44)
MCHC RBC-ENTMCNC: 30.6 PG — SIGNIFICANT CHANGE UP (ref 27–34)
MCHC RBC-ENTMCNC: 34.4 GM/DL — SIGNIFICANT CHANGE UP (ref 32–36)
MCV RBC AUTO: 88.7 FL — SIGNIFICANT CHANGE UP (ref 80–100)
MONOCYTES # BLD AUTO: 0.54 K/UL — SIGNIFICANT CHANGE UP (ref 0–0.9)
MONOCYTES NFR BLD AUTO: 9.9 % — SIGNIFICANT CHANGE UP (ref 2–14)
NEUTROPHILS # BLD AUTO: 2.77 K/UL — SIGNIFICANT CHANGE UP (ref 1.8–7.4)
NEUTROPHILS NFR BLD AUTO: 50.9 % — SIGNIFICANT CHANGE UP (ref 43–77)
NRBC # BLD: 0 /100 WBCS — SIGNIFICANT CHANGE UP (ref 0–0)
PLATELET # BLD AUTO: 250 K/UL — SIGNIFICANT CHANGE UP (ref 150–400)
POTASSIUM SERPL-MCNC: SIGNIFICANT CHANGE UP MMOL/L (ref 3.5–5.3)
POTASSIUM SERPL-SCNC: SIGNIFICANT CHANGE UP MMOL/L (ref 3.5–5.3)
PROT SERPL-MCNC: 7.4 G/DL — SIGNIFICANT CHANGE UP (ref 6–8.3)
PROTHROM AB SERPL-ACNC: 11.9 SEC — SIGNIFICANT CHANGE UP (ref 10.6–13.6)
RBC # BLD: 5.4 M/UL — SIGNIFICANT CHANGE UP (ref 4.2–5.8)
RBC # FLD: 12 % — SIGNIFICANT CHANGE UP (ref 10.3–14.5)
SODIUM SERPL-SCNC: 137 MMOL/L — SIGNIFICANT CHANGE UP (ref 135–145)
WBC # BLD: 5.44 K/UL — SIGNIFICANT CHANGE UP (ref 3.8–10.5)
WBC # FLD AUTO: 5.44 K/UL — SIGNIFICANT CHANGE UP (ref 3.8–10.5)

## 2021-12-21 PROCEDURE — 37241 VASC EMBOLIZE/OCCLUDE VENOUS: CPT

## 2021-12-21 PROCEDURE — 37242 VASC EMBOLIZE/OCCLUDE ARTERY: CPT

## 2021-12-21 PROCEDURE — 36415 COLL VENOUS BLD VENIPUNCTURE: CPT

## 2021-12-21 PROCEDURE — 93010 ELECTROCARDIOGRAM REPORT: CPT

## 2021-12-21 PROCEDURE — 80053 COMPREHEN METABOLIC PANEL: CPT

## 2021-12-21 PROCEDURE — 93005 ELECTROCARDIOGRAM TRACING: CPT

## 2021-12-21 PROCEDURE — 85610 PROTHROMBIN TIME: CPT

## 2021-12-21 PROCEDURE — 85730 THROMBOPLASTIN TIME PARTIAL: CPT

## 2021-12-21 PROCEDURE — 85025 COMPLETE CBC W/AUTO DIFF WBC: CPT

## 2021-12-21 PROCEDURE — C1889: CPT

## 2021-12-21 RX ORDER — DICLOFENAC SODIUM 30 MG/G
0 GEL TOPICAL
Qty: 0 | Refills: 0 | DISCHARGE

## 2021-12-21 RX ORDER — CLONAZEPAM 1 MG
0 TABLET ORAL
Qty: 0 | Refills: 0 | DISCHARGE

## 2021-12-21 RX ORDER — CEPHALEXIN 500 MG
1 CAPSULE ORAL
Qty: 28 | Refills: 0
Start: 2021-12-21 | End: 2021-12-27

## 2021-12-21 RX ORDER — METHADONE HYDROCHLORIDE 40 MG/1
5 TABLET ORAL ONCE
Refills: 0 | Status: DISCONTINUED | OUTPATIENT
Start: 2021-12-21 | End: 2021-12-21

## 2021-12-21 RX ORDER — METHADONE HYDROCHLORIDE 40 MG/1
5 TABLET ORAL
Qty: 0 | Refills: 0 | DISCHARGE

## 2021-12-21 RX ORDER — TOPIRAMATE 25 MG
0 TABLET ORAL
Qty: 0 | Refills: 0 | DISCHARGE

## 2021-12-21 RX ORDER — METHADONE HYDROCHLORIDE 40 MG/1
0.5 TABLET ORAL
Qty: 0 | Refills: 0 | DISCHARGE

## 2021-12-21 RX ADMIN — METHADONE HYDROCHLORIDE 5 MILLIGRAM(S): 40 TABLET ORAL at 15:59

## 2021-12-21 NOTE — BRIEF OPERATIVE NOTE - OPERATION/FINDINGS
Patient prepped and draped in sterile fashion.  Area of known malformation in abdomen was superficial and easily visualized on ultrasound, under flouroscopic guidance it was injected with 1cc of ethyl alcohol and 1cc of 3% STS.

## 2021-12-30 DIAGNOSIS — Q27.33 ARTERIOVENOUS MALFORMATION OF DIGESTIVE SYSTEM VESSEL: ICD-10-CM

## 2021-12-30 DIAGNOSIS — Z79.891 LONG TERM (CURRENT) USE OF OPIATE ANALGESIC: ICD-10-CM

## 2021-12-30 DIAGNOSIS — Z82.49 FAMILY HISTORY OF ISCHEMIC HEART DISEASE AND OTHER DISEASES OF THE CIRCULATORY SYSTEM: ICD-10-CM

## 2021-12-30 DIAGNOSIS — Z85.820 PERSONAL HISTORY OF MALIGNANT MELANOMA OF SKIN: ICD-10-CM

## 2021-12-30 DIAGNOSIS — Z87.891 PERSONAL HISTORY OF NICOTINE DEPENDENCE: ICD-10-CM

## 2021-12-30 DIAGNOSIS — G89.29 OTHER CHRONIC PAIN: ICD-10-CM

## 2022-04-18 ENCOUNTER — APPOINTMENT (OUTPATIENT)
Dept: HEART AND VASCULAR | Facility: CLINIC | Age: 67
End: 2022-04-18
Payer: COMMERCIAL

## 2022-04-18 PROBLEM — Q27.30 ARTERIOVENOUS MALFORMATION, SITE UNSPECIFIED: Chronic | Status: ACTIVE | Noted: 2021-12-10

## 2022-04-18 PROCEDURE — 99213 OFFICE O/P EST LOW 20 MIN: CPT

## 2022-04-18 PROCEDURE — 93971 EXTREMITY STUDY: CPT

## 2022-04-18 NOTE — PHYSICAL EXAM
[Alert] : alert [No Neck Mass] : no neck mass was observed [Not Tender] : non-tender [No Respiratory Distress] : no respiratory distress

## 2022-04-18 NOTE — REASON FOR VISIT
[Follow-Up: _____] : a [unfilled] follow-up visit [Spouse] : spouse [FreeTextEntry1] : venous malformation of R abdominal wall

## 2022-04-18 NOTE — ASSESSMENT
[FreeTextEntry1] : This is a 75 year-old male that is status post embolization and surgical excision for a painful lesion in the anterior abdominal wall of the right lower quadrant. 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 After the second procedure he was pain free until a few months ago when he again began noticing point tenderness and pain in the same area. He said it is not as severe as it was originally but it is still significant. On physical exam he has extreme tenderness in the same area as before. I did an ultrasound in the office and don't see any clearcut aneurysm or fluid collection. I recommended that we get a new MRI and MRA to see if there is recanalization of the embolized vessel or something else going on.

## 2022-06-08 ENCOUNTER — APPOINTMENT (OUTPATIENT)
Dept: MRI IMAGING | Facility: HOSPITAL | Age: 67
End: 2022-06-08
Payer: COMMERCIAL

## 2022-06-08 ENCOUNTER — OUTPATIENT (OUTPATIENT)
Dept: OUTPATIENT SERVICES | Facility: HOSPITAL | Age: 67
LOS: 1 days | End: 2022-06-08
Payer: COMMERCIAL

## 2022-06-08 DIAGNOSIS — Z98.890 OTHER SPECIFIED POSTPROCEDURAL STATES: Chronic | ICD-10-CM

## 2022-06-08 PROCEDURE — 74183 MRI ABD W/O CNTR FLWD CNTR: CPT | Mod: 26

## 2022-06-08 PROCEDURE — 74183 MRI ABD W/O CNTR FLWD CNTR: CPT

## 2022-06-08 PROCEDURE — A9585: CPT

## 2022-06-08 PROCEDURE — 74185 MRA ABD W OR W/O CNTRST: CPT | Mod: 26

## 2022-06-08 PROCEDURE — 74185 MRA ABD W OR W/O CNTRST: CPT

## 2024-02-12 ENCOUNTER — NON-APPOINTMENT (OUTPATIENT)
Age: 69
End: 2024-02-12

## 2025-02-21 NOTE — H&P ADULT - ASSESSMENT
66 y/o male referred by Dr. 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 Without any history of trauma, he developed pain in that area with extreme sensitivity to touch several years ago. He had nerve blocks as well as surgical exploration performed by Dr. Wagoner years ago. He said that none of these procedures had an effect on his pain and hypersensitivity to touch in the area. He ended up seeing a pain management specialist and is currently on a regimen of Methadone and several other medications without much relief. Patient did show Dr. Palmer images on his cell phone during office visit of a CT and ultrasound which did show what appears to be a fistula in the subcutaneous fat of the anterior abdominal wall. It was suggested on the CT that the blood supply was from the right inferior epigastric artery. On physical exam during office visit, he had a scar in the area from previous surgical exploration. He is exquisitely tender to touch. Dr. Palmer did not appreciate any pulsation on palpation. Dr. Palmer performed an ultrasound in the office and saw a rounded structure that does not appear to show any flow (unlike the ultrasound he brought with him which was performed a few months back). Dr. Palmer at that time felt this is not a typical problem and the best way to define and possibly fix this is to do an angiogram with selective studies to try and find the fistula and close it with embolization if feasible. Dr. Palmer also pointed out that AVM's and fistulas themselves are usually not painful so there may be something else going on, including a nerve compressed by the vascular lesion or possibly something totally unrelated. Dr. Palmer felt whatever is causing the pain seems to be neurogenic in origin. 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  In light of patient's symptoms, prior abnormal CT and ultrasound, and recent abnormal ultrasound at Dr. Palmer's office, patient now presents to [facility] for recommended angiogram/embolization.     Labs reviewed, stable.   EKG:   Pt to be consented by Dr. Palmer;s team; plan as per Dr. Palmer   66 y/o male PMH of malignant melanoma in L arm/leg s/p excision referred by Dr. 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 Without any history of trauma, he developed pain in that area with extreme sensitivity to touch several years ago. He had nerve blocks as well as surgical exploration performed by Dr. Wagoner years ago. He said that none of these procedures had an effect on his pain and hypersensitivity to touch in the area. He ended up seeing a pain management specialist and is currently on a regimen of Methadone and several other medications without much relief. Patient did show Dr. Palmer images on his cell phone during office visit of a CT and ultrasound which did show what appears to be a fistula in the subcutaneous fat of the anterior abdominal wall. It was suggested on the CT that the blood supply was from the right inferior epigastric artery. On physical exam during office visit, he had a scar in the area from previous surgical exploration. He is exquisitely tender to touch. Dr. Palmer did not appreciate any pulsation on palpation. Dr. Palmer performed an ultrasound in the office and saw a rounded structure that does not appear to show any flow (unlike the ultrasound he brought with him which was performed a few months back). Dr. Palmer at that time felt this is not a typical problem and the best way to define and possibly fix this is to do an angiogram with selective studies to try and find the fistula and close it with embolization if feasible. Dr. Palmer also pointed out that AVM's and fistulas themselves are usually not painful so there may be something else going on, including a nerve compressed by the vascular lesion or possibly something totally unrelated. Dr. Palmer felt whatever is causing the pain seems to be neurogenic in origin. 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  In light of patient's symptoms, prior abnormal CT and ultrasound, and recent abnormal ultrasound at Dr. Palmer's office, patient now presents to [facility] for recommended angiogram/embolization.     Labs reviewed, stable.   EKG: SR with PACs, non-specific ST-T changes  Pt to be consented by Dr. Palmer;s team; plan as per Dr. Palmer Yes